# Patient Record
Sex: MALE | Race: WHITE | NOT HISPANIC OR LATINO | Employment: OTHER | URBAN - METROPOLITAN AREA
[De-identification: names, ages, dates, MRNs, and addresses within clinical notes are randomized per-mention and may not be internally consistent; named-entity substitution may affect disease eponyms.]

---

## 2021-02-06 ENCOUNTER — APPOINTMENT (OUTPATIENT)
Dept: GENERAL RADIOLOGY | Facility: HOSPITAL | Age: 71
End: 2021-02-06

## 2021-02-06 ENCOUNTER — HOSPITAL ENCOUNTER (EMERGENCY)
Facility: HOSPITAL | Age: 71
Discharge: HOME | End: 2021-02-06
Attending: EMERGENCY MEDICINE | Admitting: EMERGENCY MEDICINE

## 2021-02-06 VITALS
RESPIRATION RATE: 18 BRPM | HEIGHT: 72 IN | DIASTOLIC BLOOD PRESSURE: 66 MMHG | SYSTOLIC BLOOD PRESSURE: 139 MMHG | BODY MASS INDEX: 33.68 KG/M2 | HEART RATE: 74 BPM | WEIGHT: 248.68 LBS | OXYGEN SATURATION: 99 % | TEMPERATURE: 97.8 F

## 2021-02-06 DIAGNOSIS — R21 EXANTHEM: ICD-10-CM

## 2021-02-06 DIAGNOSIS — R19.7 VOMITING AND DIARRHEA: ICD-10-CM

## 2021-02-06 DIAGNOSIS — U07.1 COVID-19: Primary | ICD-10-CM

## 2021-02-06 DIAGNOSIS — R11.10 VOMITING AND DIARRHEA: ICD-10-CM

## 2021-02-06 LAB
ANION GAP SERPL CALCULATED.3IONS-SCNC: 14 MMOL/L (ref 5–15)
BASOPHILS # BLD AUTO: 0 10*3/MM3 (ref 0–0.2)
BASOPHILS NFR BLD AUTO: 0.3 % (ref 0–1.5)
BUN SERPL-MCNC: 15 MG/DL (ref 8–23)
BUN/CREAT SERPL: 14.2 (ref 7–25)
CALCIUM SPEC-SCNC: 8.8 MG/DL (ref 8.6–10.5)
CHLORIDE SERPL-SCNC: 101 MMOL/L (ref 98–107)
CO2 SERPL-SCNC: 18 MMOL/L (ref 22–29)
CREAT SERPL-MCNC: 1.06 MG/DL (ref 0.76–1.27)
DEPRECATED RDW RBC AUTO: 42 FL (ref 37–54)
EOSINOPHIL # BLD AUTO: 0 10*3/MM3 (ref 0–0.4)
EOSINOPHIL NFR BLD AUTO: 0.2 % (ref 0.3–6.2)
ERYTHROCYTE [DISTWIDTH] IN BLOOD BY AUTOMATED COUNT: 13.9 % (ref 12.3–15.4)
GFR SERPL CREATININE-BSD FRML MDRD: 69 ML/MIN/1.73
GLUCOSE SERPL-MCNC: 197 MG/DL (ref 65–99)
HCT VFR BLD AUTO: 42.4 % (ref 37.5–51)
HGB BLD-MCNC: 14.2 G/DL (ref 13–17.7)
LYMPHOCYTES # BLD AUTO: 0.3 10*3/MM3 (ref 0.7–3.1)
LYMPHOCYTES NFR BLD AUTO: 5 % (ref 19.6–45.3)
MCH RBC QN AUTO: 29.4 PG (ref 26.6–33)
MCHC RBC AUTO-ENTMCNC: 33.6 G/DL (ref 31.5–35.7)
MCV RBC AUTO: 87.4 FL (ref 79–97)
MONOCYTES # BLD AUTO: 0.2 10*3/MM3 (ref 0.1–0.9)
MONOCYTES NFR BLD AUTO: 3.1 % (ref 5–12)
NEUTROPHILS NFR BLD AUTO: 6.3 10*3/MM3 (ref 1.7–7)
NEUTROPHILS NFR BLD AUTO: 91.4 % (ref 42.7–76)
NRBC BLD AUTO-RTO: 0.1 /100 WBC (ref 0–0.2)
PLATELET # BLD AUTO: 228 10*3/MM3 (ref 140–450)
PMV BLD AUTO: 9.7 FL (ref 6–12)
POTASSIUM SERPL-SCNC: 4 MMOL/L (ref 3.5–5.2)
RBC # BLD AUTO: 4.85 10*6/MM3 (ref 4.14–5.8)
SARS-COV-2 RNA PNL SPEC NAA+PROBE: DETECTED
SODIUM SERPL-SCNC: 133 MMOL/L (ref 136–145)
WBC # BLD AUTO: 6.9 10*3/MM3 (ref 3.4–10.8)

## 2021-02-06 PROCEDURE — 85025 COMPLETE CBC W/AUTO DIFF WBC: CPT | Performed by: EMERGENCY MEDICINE

## 2021-02-06 PROCEDURE — M0239 BAMLANIVIMAB-XXXX INFUSION: HCPCS | Performed by: EMERGENCY MEDICINE

## 2021-02-06 PROCEDURE — 25010000006 BAMLANIVIMAB 700 MG/20ML SOLUTION 20 ML VIAL: Performed by: EMERGENCY MEDICINE

## 2021-02-06 PROCEDURE — 63710000001 PREDNISONE PER 1 MG: Performed by: EMERGENCY MEDICINE

## 2021-02-06 PROCEDURE — 87635 SARS-COV-2 COVID-19 AMP PRB: CPT | Performed by: EMERGENCY MEDICINE

## 2021-02-06 PROCEDURE — 80048 BASIC METABOLIC PNL TOTAL CA: CPT | Performed by: EMERGENCY MEDICINE

## 2021-02-06 PROCEDURE — 99284 EMERGENCY DEPT VISIT MOD MDM: CPT

## 2021-02-06 PROCEDURE — 71045 X-RAY EXAM CHEST 1 VIEW: CPT

## 2021-02-06 RX ORDER — METHYLPREDNISOLONE SODIUM SUCCINATE 125 MG/2ML
125 INJECTION, POWDER, LYOPHILIZED, FOR SOLUTION INTRAMUSCULAR; INTRAVENOUS ONCE AS NEEDED
Status: DISCONTINUED | OUTPATIENT
Start: 2021-02-06 | End: 2021-02-06 | Stop reason: HOSPADM

## 2021-02-06 RX ORDER — EPINEPHRINE 1 MG/ML
0.3 INJECTION, SOLUTION, CONCENTRATE INTRAVENOUS ONCE AS NEEDED
Status: DISCONTINUED | OUTPATIENT
Start: 2021-02-06 | End: 2021-02-06 | Stop reason: HOSPADM

## 2021-02-06 RX ORDER — DIPHENHYDRAMINE HYDROCHLORIDE 50 MG/ML
50 INJECTION INTRAMUSCULAR; INTRAVENOUS ONCE AS NEEDED
Status: DISCONTINUED | OUTPATIENT
Start: 2021-02-06 | End: 2021-02-06 | Stop reason: HOSPADM

## 2021-02-06 RX ORDER — SODIUM CHLORIDE 9 MG/ML
30 INJECTION, SOLUTION INTRAVENOUS ONCE
Status: COMPLETED | OUTPATIENT
Start: 2021-02-06 | End: 2021-02-06

## 2021-02-06 RX ORDER — PREDNISONE 20 MG/1
40 TABLET ORAL ONCE
Status: COMPLETED | OUTPATIENT
Start: 2021-02-06 | End: 2021-02-06

## 2021-02-06 RX ADMIN — SODIUM CHLORIDE 30 ML: 9 INJECTION, SOLUTION INTRAVENOUS at 18:36

## 2021-02-06 RX ADMIN — PREDNISONE 40 MG: 20 TABLET ORAL at 15:55

## 2021-02-06 RX ADMIN — SODIUM CHLORIDE 700 MG: 9 INJECTION, SOLUTION INTRAVENOUS at 18:18

## 2021-02-06 NOTE — ED NOTES
IV insertion attempts x2 unsuccessful. Request made for another nurse to attempt.     Azul Little, RN  02/06/21 3430

## 2021-02-06 NOTE — ED NOTES
Pt c/o itchy, painful rash to entire body and cough x4 days with n/v/d onset 1 day ago; states recent back Sx.     Azul Little, RN  02/06/21 1530

## 2021-02-06 NOTE — ED NOTES
The FDA has issued an Emergency Use Authorization (EUA) to permit emergency use of the unapproved product bamlanivimab for treatment of laboratory confirmed COVID-19 in certain ambulatory patients. There is no prescribing information or package insert, however, the FDA has authorized distribution of Fact Sheets for patients and/or caregivers for additional information on this investigational therapy. I have provided the Fact Sheet to and discussed the following with Lebron Lopez and he agreed to proceed:  • FDA has authorized the emergency use (EUA) of bamlanivimab, which is not an FDA approved drug.  • Lebron Lopez has the option to accept or refuse bamlanivimab at any time.  • The significant known and potential risks and benefits of bamlanivimab and the extent to which such risks and benefits are unknown.  • Information on available alternative treatments and the risks and benefits of those alternatives have been shared.         Azul Little RN  02/06/21 5233

## 2021-02-06 NOTE — ED NOTES
IV attempts x2 unsuccessful by [place]. Request for another nurse to attempt.     Azul Little, RN  02/06/21 9430

## 2021-02-06 NOTE — ED PROVIDER NOTES
Subjective   History of Present Illness  Context: 70-year-old male presents with rash.  He states that started Tuesday.  He has had no fever.  He has had some fatigue weakness.  He states he did have episode of vomiting yesterday and diarrhea.  No blood in stool or emesis.  Reports he has had a cough.  No shortness of breath.  He has had no change in taste or smell.  He was just discharged from rehab after back surgery 1 week ago  Location: Generalized  Quality: Rash  Duration: 5 days  Timing: Constant  Severity:   Modifying factors:  Associated signs and symptoms: Vomiting, diarrhea, cough, fatigue    Review of Systems   Constitutional: Negative for fever and unexpected weight change.   HENT: Negative for congestion and sore throat.    Eyes: Negative for pain and visual disturbance.   Respiratory: Positive for cough. Negative for shortness of breath.    Cardiovascular: Negative for chest pain and leg swelling.   Gastrointestinal: Positive for diarrhea, nausea and vomiting. Negative for abdominal pain.   Genitourinary: Negative for dysuria and urgency.   Musculoskeletal: Positive for back pain. Negative for myalgias.   Skin: Positive for rash.   Neurological: Positive for weakness. Negative for dizziness and headaches.   Psychiatric/Behavioral: Negative for confusion.     Negative for fever shortness of breath.  Positive for fatigue, nausea episode of vomiting yesterday diarrhea yesterday.  No dysuria.  He is able to ambulate.  Past Medical History:   Diagnosis Date   • Diabetes mellitus (CMS/HCC)    • Hyperlipidemia    • Hypertension      Hypertension, COPD, diabetes, reflux, lumbar spine surgery hypothyroid  No Known Allergies    Past Surgical History:   Procedure Laterality Date   • BACK SURGERY     • HERNIA REPAIR     • SHOULDER SURGERY Bilateral        History reviewed. No pertinent family history.  Social history does not smoke    Current medications albuterol amlodipine vitamin C aspirin levothyroxine  Metformin Protonix simvastatin B12 Neurontin, hydrocodone, aspirin  Social History     Socioeconomic History   • Marital status:      Spouse name: Not on file   • Number of children: Not on file   • Years of education: Not on file   • Highest education level: Not on file   Tobacco Use   • Smoking status: Former Smoker   Substance and Sexual Activity   • Alcohol use: Not Currently   • Drug use: Defer   • Sexual activity: Defer           Objective   Physical Exam  70-year-old male awake alert pupils equal round [place].  Oropharynx mucous membranes moist no erosions.  Neck supple chest clear cardiovascular regular rhythm abdomen soft nontender.  Examination of skin he has a diffuse erythematous rash with serpentine erythematous margins.  There is some central clearing noted.  There are no petechiae or purpura.  Procedures           ED Course      Results for orders placed or performed during the hospital encounter of 02/06/21   COVID-19, [facility]OTT IN-HOUSE,NASAL Swab (NO TRANSPORT MEDIA) 2 HR TAT - Swab, Nasopharynx    Specimen: Nasopharynx; Swab   Result Value Ref Range    COVID19 Detected (C) Presumptive Negative - Ref. Range   Basic Metabolic Panel    Specimen: Blood   Result Value Ref Range    Glucose 197 (H) 65 - 99 mg/dL    BUN 15 8 - 23 mg/dL    Creatinine 1.06 0.76 - 1.27 mg/dL    Sodium 133 (L) 136 - 145 mmol/L    Potassium 4.0 3.5 - 5.2 mmol/L    Chloride 101 98 - 107 mmol/L    CO2 18.0 (L) 22.0 - 29.0 mmol/L    Calcium 8.8 8.6 - 10.5 mg/dL    eGFR Non African Amer 69 >60 mL/min/1.73    BUN/Creatinine Ratio 14.2 7.0 - 25.0    Anion Gap 14.0 5.0 - 15.0 mmol/L   CBC Auto Differential    Specimen: Blood   Result Value Ref Range    WBC 6.90 3.40 - 10.80 10*3/mm3    RBC 4.85 4.14 - 5.80 10*6/mm3    Hemoglobin 14.2 13.0 - 17.7 g/dL    Hematocrit 42.4 37.5 - 51.0 %    MCV 87.4 79.0 - 97.0 fL    MCH 29.4 26.6 - 33.0 pg    MCHC 33.6 31.5 - 35.7 g/dL    RDW 13.9 12.3 - 15.4 %    RDW-SD 42.0 37.0 - 54.0 fl    MPV  "9.7 6.0 - 12.0 fL    Platelets 228 140 - 450 10*3/mm3    Neutrophil % 91.4 (H) 42.7 - 76.0 %    Lymphocyte % 5.0 (L) 19.6 - 45.3 %    Monocyte % 3.1 (L) 5.0 - 12.0 %    Eosinophil % 0.2 (L) 0.3 - 6.2 %    Basophil % 0.3 0.0 - 1.5 %    Neutrophils, Absolute 6.30 1.70 - 7.00 10*3/mm3    Lymphocytes, Absolute 0.30 (L) 0.70 - 3.10 10*3/mm3    Monocytes, Absolute 0.20 0.10 - 0.90 10*3/mm3    Eosinophils, Absolute 0.00 0.00 - 0.40 10*3/mm3    Basophils, Absolute 0.00 0.00 - 0.20 10*3/mm3    nRBC 0.1 0.0 - 0.2 /100 WBC     Xr Chest 1 View    Result Date: 2/6/2021  1.No definite acute pulmonary process. 2.Density left basilar that probably relates to confluence of shadows.  Electronically Signed By-Cullen John MD On:2/6/2021 4:55 PM This report was finalized on [id number] by  Cullen John MD.    Medications   EPINEPHrine PF (ADRENALIN) injection 0.3 mg (has no administration in time range)   diphenhydrAMINE (BENADRYL) injection 50 mg (has no administration in time range)   methylPREDNISolone sodium succinate (SOLU-Medrol) injection 125 mg (has no administration in time range)   predniSONE (DELTASONE) tablet 40 mg (40 mg Oral Given 2/6/21 1555)   bamlanivimab 700 mg in sodium chloride 0.9 % 70 mL IVPB (0 mg Intravenous Stopped 2/6/21 1835)   sodium chloride 0.9 % infusion 30 mL (0 mL Intravenous Stopped 2/6/21 1840)     /66 (BP Location: Right arm, Patient Position: Lying)   Pulse 74   Temp 97.8 °F (36.6 °C) (Oral)   Resp 18   Ht 182.9 cm (72\")   Wt 113 kg (248 lb 10.9 oz)   SpO2 99%   BMI 33.73 kg/m²                                        MDM  Chart review: Patient on the 19th of last month he had anterior spine fusion and posterior lumbar decompression and fusion with hardware removal.  Comorbidity: As per past history  Differential: Allergic reaction, bronchitis, pneumonia, Covid,  My EKG interpretation:   Lab: COVID-19 detected, CBC normal white count, 91 segs 5 lymphs.  Basic metabolic panel glucose " 197 BUN 15 creatinine 1.0  Radiology: I reviewed chest x-ray no acute cardiopulmonary abnormality noted.  There is some left basilar density that appears to be confluence of shadows.  Discussion/treatment: Patient IV placed.  He was initially given dose of prednisone thinking this may be allergic reaction.  After his Covid came back positive suspect that this is related to Covid.  Bamlanivimab was discussed with him including it is released under emergency use authorization.  After discussion he desires to proceed.  This was administered.  He tolerated without complication.  He was discharged.  Advised rest follow-up PMD return new or worsening symptoms or trouble breathing.  Patient was evaluated using appropriate PPE      Final diagnoses:   COVID-19   Exanthem   Vomiting and diarrhea            Musa Ag MD  02/06/21 1495

## 2021-02-07 NOTE — DISCHARGE INSTRUCTIONS
May use Benadryl Allegra or Zyrtec for itching, follow-up PMD, return new or worsening symptoms or trouble breathing.  May use Mucinex DM for cough.

## 2021-02-08 ENCOUNTER — PATIENT OUTREACH (OUTPATIENT)
Dept: CASE MANAGEMENT | Facility: OTHER | Age: 71
End: 2021-02-08

## 2021-02-08 ENCOUNTER — EPISODE CHANGES (OUTPATIENT)
Dept: CASE MANAGEMENT | Facility: OTHER | Age: 71
End: 2021-02-08

## 2021-02-08 NOTE — OUTREACH NOTE
Cancel -- results discussed at [facility] on Monday, Feb. 6,2017   Care Coordination Note  Talked with Bárbara/ PCP office [phone] regarding patient's 2/6/21 ED visit; lab results; patient outreach and recommendations. She verbalized understanding and will give information to PCP for recommendations.    Sharda Whitney RN  Ambulatory     2/8/2021, 11:53 EST

## 2021-02-08 NOTE — OUTREACH NOTE
ED Potential Covid Discharge Follow-up  Talked with patient. Discussed 2/6/21 ED visit regarding COVID 19.  Patient received COVID 19 test results as positive. Patient compliant with ED recommendations;  takingand under quarantine. Patient states alex corrigan been discharged from [facility] on 2/6/21 following rehab for back surgery. He is ambulating without assistive device and  states to be compliant with with medications for HTN; DM (Metformin; Neurontin; Amlodipine and Levothyroixine)  Patient reports symptoms of: Nonproductive dry  cough; episodes of nausea; vomiting; diarrhea decreased appetite. Patient reports rash, itching to arm; and spread of rash to shoulder. He followed ED recommendations and took Zyrtec which he states helped  but continues with itching. No difficulty with chest pain; SOB; appetite or sleeping.  Patient reports no barriers in obtaining : food; medication and has transportation (assisted by son)   Patient lives alone; independent with ADL's; receiving assistance from son as needed. Patient states to be compliant with medications.   Reviewed with patient: ED AVS recommendations; education provided regarding HTN; DM; monitoring of blood pressure and blood sugar;quarantine education; education regarding being free of fever for 24 hours without use of Tylenol; hydration;  24/7 Nurse Triage Line; COVID 19 information telephone line; ACM contact information;  My Chart; Telehealth appointment availability;  affordability of food; medications; transportation; continued monitoring of symptoms; evaluation of worsening symptoms and establishing contact with PCP for follow up recommendations. Patient verbalized understanding and voiced intent to contact PCP for recommendations. He states to appreciate phone call. No further questions voiced at this time.     Sharda Whitney RN  Ambulatory     2/8/2021, 11:31 EST

## 2021-02-09 ENCOUNTER — PATIENT OUTREACH (OUTPATIENT)
Dept: CASE MANAGEMENT | Facility: OTHER | Age: 71
End: 2021-02-09

## 2021-02-09 NOTE — OUTREACH NOTE
ED Potential Covid Discharge Follow-up  Talked with patient. Patient states he received PCP recommendations; will take medications as directed and will contact PCP today regarding questions. Patient reports to continue with cough; taking Mucinex DM; itching due to rash; episodes of SOB; improvement regarding appetite; and no difficulty with fever; chest pain or sleeping. Patient states to be monioring blood sugar (171 today); O2 SAT (98%) and blood pressure. Receiving assistance from son as needed. Reviewed with patient 24/7 Nurse Line;home exercise program from rehab;  education provided; quarentine; medical  follow up appointments; [facility] Planning and Support contact information. Patient verbalized understanding and will reschedule appointments. Patient sates to appreciate phone call. No further questions or concerns voiced at this time.     Sharda Whitney RN  Ambulatory     2/9/2021, 11:35 EST